# Patient Record
Sex: MALE | Race: WHITE | URBAN - METROPOLITAN AREA
[De-identification: names, ages, dates, MRNs, and addresses within clinical notes are randomized per-mention and may not be internally consistent; named-entity substitution may affect disease eponyms.]

---

## 2024-01-01 ENCOUNTER — TELEPHONE (OUTPATIENT)
Dept: CARDIOLOGY | Facility: CLINIC | Age: 69
End: 2024-01-01

## 2024-01-02 NOTE — TELEPHONE ENCOUNTER
Patient was seen by Dr. Gold at [facility] on 1/2/2024.    Plan:  No further follow up with Dr. Gold required  Follow up with Dr. Zelaya (primary Cardiologist) in 3-4 months ([place])    Additional Action needed:  N/A    [facility] scheduling needs:   N/A      Plan was reviewed with the patient at time of the visit.  Patient verbalized understanding, agreed with plan and denied any further questions. Tiny Patrick RN on 1/2/2024 at 3:43 PM